# Patient Record
(demographics unavailable — no encounter records)

---

## 2024-10-14 NOTE — REVIEW OF SYSTEMS
[Cough] : cough [Sputum] : sputum [GERD] : gerd [Diabetes] : diabetes [Fever] : no fever [Postnasal Drip] : no postnasal drip [Hemoptysis] : no hemoptysis [Dyspnea] : no dyspnea [Wheezing] : no wheezing [Chest Discomfort] : no chest discomfort [Edema] : no edema [Hay Fever] : no hay fever [Myalgias] : no myalgias [Rash] : no rash [Headache] : no headache [Anxiety] : no anxiety

## 2024-10-14 NOTE — PROCEDURE
[FreeTextEntry1] : Chest x-ray 8/19/11: Clear.   CT Chest 1/17/24: No nodules.  Few calcified granulomata.  No pleural effusion.  No evident adenopathy.  Hepatic steatosis.  PFT 12/27/23: Spirometry was normal. Lung volumes were normal. Mild reduction in diffusion. No significant improvement after bronchodilator.

## 2024-10-14 NOTE — PHYSICAL EXAM
[No Acute Distress] : no acute distress [Normal Oropharynx] : normal oropharynx [No Neck Mass] : no neck mass [Normal Rate/Rhythm] : normal rate/rhythm [Normal S1, S2] : normal s1, s2 [No Resp Distress] : no resp distress [Clear to Auscultation Bilaterally] : clear to auscultation bilaterally [Benign] : benign [No HSM] : no hsm [Normal Gait] : normal gait [No Cyanosis] : no cyanosis [No Focal Deficits] : no focal deficits [Oriented x3] : oriented x3 [Normal Affect] : normal affect

## 2024-10-14 NOTE — HISTORY OF PRESENT ILLNESS
[Former] : former [TextBox_4] : Continues to complain of chronic cough.  She is on lisinopril.  Remains on Breztri. [YearQuit] : 2023 [Awakes Unrefreshed] : does not awaken unrefreshed

## 2024-10-14 NOTE — DISCUSSION/SUMMARY
[FreeTextEntry1] : Impression Chronic cough -On lisinopril Chronic sinus problems Former smoker  Recommend Speak to primary care provider regarding the ACE inhibitor, lisinopril -Most common side effect is cough Continue with Breztri Montelukast was added Continue with nasal rinses Follow-up CT in January I will see her again in 3 months

## 2024-10-17 NOTE — ASSESSMENT
[FreeTextEntry1] : This is a 58-year-old female with type 2 diabetes mellitus with neuropathy, hypertension, hyperlipidemia, vitamin D deficiency, asthma, psoriasis, cellulitis, bipolar, anxiety, arthritis, IBS, eczema, depression, history of substance abuse, here for a follow-up.  1. T2DM Check hbA1c.  She is on Ozempic 2 mg weekly and is tolerating it well and metformin  mg twice a day. Check vitamin B12 as she is on metformin.  Dexcom CGM was downloaded, interpreted, and reviewed today. Date range 9/20/24 - 10/17/24. Average glucose 99 mg/dL, glucose variability 13.5%, GMI N/A, target range 100%, high 0%, very high 0%, low 0%, very low 0%. As she desires further weight loss, will start Mounjaro 5 mg weekly and increase as tolerated  Hypoglycemia noted. Will discontinue metformin. Advised to confirm hypoglycemia with fingerstick glucose level.  She saw an ophthalmologist for a dilated eye exam in October 2024 Denies retinopathy.  She has neuropathy. No known nephropathy. Check urine microalbumin.  She is taking atorvastatin 20 mg daily. She is taking losartan 100 mg daily.  Goes to podiatrist frequently.  Avoid SGLT2 inhibitors due to frequent history of UTIs.  2. Hypertension Well controlled.  3. Hyperlipidemia  She is taking atorvastatin 20 mg daily. Check lipid panel.

## 2024-10-17 NOTE — HISTORY OF PRESENT ILLNESS
[FreeTextEntry1] : CC: Diabetes  This is a 58-year-old female with type 2 diabetes mellitus with neuropathy, hypertension, hyperlipidemia, vitamin D deficiency, asthma, psoriasis, cellulitis, bipolar disorder, anxiety, arthritis, IBS, eczema, depression, history of substance abuse, here for a follow-up.  Diabetes was diagnosed at age 52.  She is on Ozempic 2 mg weekly and is tolerating it well and metformin  mg twice a day.  She saw an ophthalmologist for a dilated eye exam in October 2024. Denies retinopathy. She has neuropathy. No known nephropathy.  She is taking atorvastatin 20 mg daily.  She is taking losartan 100 mg daily.  Goes to podiatrist frequently.

## 2024-10-17 NOTE — ADDENDUM
[FreeTextEntry1] :  By signing my name below, I, Javed Elizabeth, attest that this document has been prepared under the direction and in the presence of Dr. Jeronimo.  I, Morenita Jeronimo MD, personally performed the services described in this documentation. All medical record entries made by the scribe were at my discretion and in my presence. I have reviewed the chart and discharge instructions (if applicable) and agree that the record reflects my personal permanence and is accurate and complete.

## 2024-10-17 NOTE — PHYSICAL EXAM
[Alert] : alert [Well Nourished] : well nourished [Healthy Appearance] : healthy appearance [No Acute Distress] : no acute distress [Well Developed] : well developed [Normal Sclera/Conjunctiva] : normal sclera/conjunctiva [No Proptosis] : no proptosis [No Neck Mass] : no neck mass was observed [No LAD] : no lymphadenopathy [Supple] : the neck was supple [Thyroid Not Enlarged] : the thyroid was not enlarged [No Thyroid Nodules] : no palpable thyroid nodules [Well Healed Scar] : well healed scar [No Respiratory Distress] : no respiratory distress [Normal Affect] : the affect was normal [Normal Insight/Judgement] : insight and judgment were intact [Normal Mood] : the mood was normal [de-identified] : healed anterior neck scar from cervical spine surgery  [de-identified] : foot exam declined as she sees podiatrist

## 2024-10-17 NOTE — REVIEW OF SYSTEMS
[Recent Weight Loss (___ Lbs)] : recent weight loss: [unfilled] lbs [Stress] : stress [All other systems negative] : All other systems negative [de-identified] : neuropathy

## 2025-03-23 NOTE — PHYSICAL EXAM
[Alert] : alert [Well Nourished] : well nourished [Healthy Appearance] : healthy appearance [No Acute Distress] : no acute distress [Well Developed] : well developed [Normal Sclera/Conjunctiva] : normal sclera/conjunctiva [No Proptosis] : no proptosis [No Neck Mass] : no neck mass was observed [No LAD] : no lymphadenopathy [Supple] : the neck was supple [Thyroid Not Enlarged] : the thyroid was not enlarged [No Thyroid Nodules] : no palpable thyroid nodules [Well Healed Scar] : well healed scar [No Respiratory Distress] : no respiratory distress [Normal Affect] : the affect was normal [Normal Insight/Judgement] : insight and judgment were intact [Normal Mood] : the mood was normal [de-identified] : healed anterior neck scar from cervical spine surgery  [de-identified] : foot exam declined as she sees podiatrist

## 2025-03-23 NOTE — PHYSICAL EXAM
[Alert] : alert [Well Nourished] : well nourished [Healthy Appearance] : healthy appearance [No Acute Distress] : no acute distress [Well Developed] : well developed [Normal Sclera/Conjunctiva] : normal sclera/conjunctiva [No Proptosis] : no proptosis [No Neck Mass] : no neck mass was observed [No LAD] : no lymphadenopathy [Supple] : the neck was supple [Thyroid Not Enlarged] : the thyroid was not enlarged [No Thyroid Nodules] : no palpable thyroid nodules [Well Healed Scar] : well healed scar [No Respiratory Distress] : no respiratory distress [Normal Affect] : the affect was normal [Normal Insight/Judgement] : insight and judgment were intact [Normal Mood] : the mood was normal [de-identified] : healed anterior neck scar from cervical spine surgery  [de-identified] : foot exam declined as she sees podiatrist

## 2025-03-23 NOTE — ASSESSMENT
[FreeTextEntry1] : This is a 58-year-old female with type 2 diabetes mellitus with neuropathy, hypertension, hyperlipidemia, vitamin D deficiency, asthma, psoriasis, cellulitis, bipolar, anxiety, arthritis, IBS, eczema, depression, history of substance abuse, here for a follow-up.  1. T2DM Check hbA1c. Maycol consider discontinuing metformin pending bloodwork results.   She is on Mounjaro 7.5 mg weekly and metformin  mg 1x daily. She was on Ozempic in the past. As she desires more weight loss, increase Mounjaro to 10 mg weekly. Will increase as tolerated.  Check vitamin B12 as she is on metformin.  She saw an ophthalmologist for a dilated eye exam in October 2024. Denies retinopathy.  She has neuropathy. No known nephropathy. Check urine microalbumin.  She is taking atorvastatin 20 mg daily. She is taking losartan 100 mg daily.  Goes to podiatrist frequently.  Avoid SGLT2 inhibitors due to frequent history of UTIs.  2. Hypertension Well controlled.  3. Hyperlipidemia  She is taking atorvastatin 20 mg daily. Check lipid panel.

## 2025-03-23 NOTE — HISTORY OF PRESENT ILLNESS
[FreeTextEntry1] : CC: Diabetes  This is a 58-year-old female with type 2 diabetes mellitus with neuropathy, hypertension, hyperlipidemia, vitamin D deficiency, asthma, psoriasis, cellulitis, bipolar disorder, anxiety, arthritis, IBS, eczema, depression, history of substance abuse, here for a follow-up.  Diabetes was diagnosed at age 52.  She is on Mounjaro 7.5 mg weekly and metformin  mg 1x daily. She was on Ozempic in the past.  She saw an ophthalmologist for a dilated eye exam in October 2024. Denies retinopathy. She has neuropathy. No known nephropathy.  She is taking atorvastatin 20 mg daily.  She is taking losartan 100 mg daily.  Goes to podiatrist frequently.

## 2025-03-23 NOTE — REVIEW OF SYSTEMS
[Recent Weight Loss (___ Lbs)] : recent weight loss: [unfilled] lbs [Stress] : stress [All other systems negative] : All other systems negative [de-identified] : neuropathy Detail Level: Zone Detail Level: Detailed

## 2025-03-23 NOTE — REVIEW OF SYSTEMS
[Recent Weight Loss (___ Lbs)] : recent weight loss: [unfilled] lbs [Stress] : stress [All other systems negative] : All other systems negative [de-identified] : neuropathy

## 2025-04-01 NOTE — HISTORY OF PRESENT ILLNESS
[FreeTextEntry1] : Left forearm cat bite  This is a very pleasant 58-year-old female presenting to me for follow-up from an admission at [facility] for a cat bite.  She previously underwent an I&D by myself of the left forearm abscess at bedside last week.  She had extensive cellulitis and was admitted for a few days for IV antibiotics and discharged on antibiotics per ID.  Still taking the antibiotics and reports that her pain swelling and infection has much improved.

## 2025-04-01 NOTE — PHYSICAL EXAM
[de-identified] : Left forearm No cellulitis and no signs of infection I&D incision at the proximal forearm has completely healed with some surrounding swelling however no purulence Full elbow range of motion, painless AIN, PIN and ulnar nerve intact

## 2025-04-01 NOTE — DISCUSSION/SUMMARY
[FreeTextEntry1] : 58-year-old female with previous admission for cat bite to left forearm here today for first follow-up after her discharge with no signs of infection and full painless range of motion.  We discussed completing the antibiotics and if there were any further signs of infection she should return to me or go to the emergency room.  All questions were answered.

## 2025-05-01 NOTE — PHYSICAL EXAM
[General Appearance - Alert] : alert [General Appearance - In No Acute Distress] : in no acute distress [Sclera] : the sclera and conjunctiva were normal [PERRL With Normal Accommodation] : pupils were equal in size, round, reactive to light [Extraocular Movements] : extraocular movements were intact [Outer Ear] : the ears and nose were normal in appearance [Oropharynx] : the oropharynx was normal with no thrush [Auscultation Breath Sounds / Voice Sounds] : lungs were clear to auscultation bilaterally [Heart Rate And Rhythm] : heart rate was normal and rhythm regular [Heart Sounds] : normal S1 and S2 [Heart Sounds Gallop] : no gallops [Murmurs] : no murmurs [Heart Sounds Pericardial Friction Rub] : no pericardial rub [Bowel Sounds] : normal bowel sounds [Abdomen Soft] : soft [Abdomen Tenderness] : non-tender [Abdomen Mass (___ Cm)] : no abdominal mass palpated [Costovertebral Angle Tenderness] : no CVA tenderness [Skin Color & Pigmentation] : normal skin color and pigmentation [] : no rash [Sensation] : the sensory exam was normal to light touch and pinprick [No Focal Deficits] : no focal deficits [Oriented To Time, Place, And Person] : oriented to person, place, and time [Affect] : the affect was normal [FreeTextEntry1] : LUE with distinct area of scarring where cat bite occured, no drainage, no tenderness

## 2025-05-01 NOTE — HISTORY OF PRESENT ILLNESS
[FreeTextEntry1] : Ms. Tika Melgar is a 58-year-old female who is being seen in ID clinic as a follow-up appointment after recent hospitalization.  Patient with recent hospitalization at the end of March 2025.  Patient was treated for cellulitis of the left upper extremity after a cat bite.  Patient was discharged with a plan for 10 days of antibiotics consisting of p.o. amoxicillin/clavulanic acid with an end date of 4/3/2025.  Patient today overall feels well.  LUE with bumps at bite site however patient denies pain, discomfort, drainage.  Reports intermittent tingling of lateral hand at 4th and 5th digit.  Followed up with Ortho on 4/1/2025.

## 2025-05-01 NOTE — ASSESSMENT
[FreeTextEntry1] : Ms. Tika Melgar  Is a 58-year-old female who is following up in ID clinic after recent hospital admission March 2025 for cellulitis in the setting of cat bite  Overall, today reassuring exam. Area of cat bite healed however does have induration but no drainage, no pain, no tenderness on exam. Moving LUE with no issues.  Reports possible subjective fevers but not measured. No further indication for antibiotics at this point.  Advised to monitor and if new range of motion issues develop, fever, chills to notify clinic.   Devonte Arevalo MD Division of Infectious Diseases